# Patient Record
(demographics unavailable — no encounter records)

---

## 2024-11-04 NOTE — PHYSICAL EXAM
[No Acute Distress] : no acute distress [Well Nourished] : well nourished [Well Developed] : well developed [Well-Appearing] : well-appearing [Normal Voice/Communication] : normal voice/communication [Normal Sclera/Conjunctiva] : normal sclera/conjunctiva [PERRL] : pupils equal round and reactive to light [EOMI] : extraocular movements intact [Normal Outer Ear/Nose] : the outer ears and nose were normal in appearance [Normal Oropharynx] : the oropharynx was normal [Normal Nasal Mucosa] : the nasal mucosa was normal [No JVD] : no jugular venous distention [No Lymphadenopathy] : no lymphadenopathy [Supple] : supple [Thyroid Normal, No Nodules] : the thyroid was normal and there were no nodules present [No Respiratory Distress] : no respiratory distress  [No Accessory Muscle Use] : no accessory muscle use [Clear to Auscultation] : lungs were clear to auscultation bilaterally [Normal Rate] : normal rate  [Regular Rhythm] : with a regular rhythm [Normal S1, S2] : normal S1 and S2 [No Carotid Bruits] : no carotid bruits [No Abdominal Bruit] : a ~M bruit was not heard ~T in the abdomen [No Varicosities] : no varicosities [Pedal Pulses Present] : the pedal pulses are present [No Edema] : there was no peripheral edema [No Palpable Aorta] : no palpable aorta [No Extremity Clubbing/Cyanosis] : no extremity clubbing/cyanosis [Normal Appearance] : normal in appearance [Soft] : abdomen soft [Non Tender] : non-tender [Non-distended] : non-distended [No Masses] : no abdominal mass palpated [No HSM] : no HSM [Normal Bowel Sounds] : normal bowel sounds [Declined Rectal Exam] : declined rectal exam [Normal Supraclavicular Nodes] : no supraclavicular lymphadenopathy [Normal Axillary Nodes] : no axillary lymphadenopathy [Normal Posterior Cervical Nodes] : no posterior cervical lymphadenopathy [Normal Anterior Cervical Nodes] : no anterior cervical lymphadenopathy [Normal Inguinal Nodes] : no inguinal lymphadenopathy [Normal Femoral Nodes] : no femoral lymphadenopathy [No CVA Tenderness] : no CVA  tenderness [No Spinal Tenderness] : no spinal tenderness [No Joint Swelling] : no joint swelling [Grossly Normal Strength/Tone] : grossly normal strength/tone [Coordination Grossly Intact] : coordination grossly intact [No Focal Deficits] : no focal deficits [Normal Gait] : normal gait [Deep Tendon Reflexes (DTR)] : deep tendon reflexes were 2+ and symmetric [Speech Grossly Normal] : speech grossly normal [Memory Grossly Normal] : memory grossly normal [Normal Affect] : the affect was normal [Alert and Oriented x3] : oriented to person, place, and time [Normal Mood] : the mood was normal [Normal Insight/Judgement] : insight and judgment were intact [de-identified] : Well-healed sternotomy scar, well-healed venous harvest from right lower extremity [de-identified] : Soft systolic ejection murmur

## 2024-11-04 NOTE — HEALTH RISK ASSESSMENT
[Never (0 pts)] : Never (0 points) [No] : In the past 12 months have you used drugs other than those required for medical reasons? No [No falls in past year] : Patient reported no falls in the past year [Little interest or pleasure doing things] : 1) Little interest or pleasure doing things [Feeling down, depressed, or hopeless] : 2) Feeling down, depressed, or hopeless [0] : 2) Feeling down, depressed, or hopeless: Not at all (0) [PHQ-2 Negative - No further assessment needed] : PHQ-2 Negative - No further assessment needed [Audit-CScore] : 0 [[id number]] : 0 [Never] : Never

## 2024-11-04 NOTE — ASSESSMENT
[FreeTextEntry1] : Assessment and plan:  1.  Status post colonoscopy which was done earlier today multiple polyps were resected pathology reports are still pending patient will have follow-up colonoscopy in 3 years that is also depending upon pathology report.  2.  BPH patient states that his urination is doing extremely well with tamsulosin 0.4 mg 1 capsule daily  3.  Coronary artery disease status post CABG x 2, hyperlipidemia continue atorvastatin 40 mg p.o. daily and aspirin 81 mg p.o. daily.  4.  Hypertension patient's blood pressure under excellent control continue metoprolol tartrate 25 mg 1 tab twice a day  5.  Rosacea continue metronidazole external cream as directed  6.  Anemia comprehensive blood work drawn in office by examiner patient is taking B12 supplementation anemia workup that is CBC with differential, B12 and folate, iron studies.  7.  I reviewed previous blood work with patient and his daughter they had multiple questions regarding the blood work and also regarding today's procedure colonoscopy I answered all questions to their satisfaction.  And to the best of my capabilities.  8.  Total time spent face-to-face and non-face-to-face time 40 minutes the majority of which was spent on counseling and coordination of care.

## 2024-11-04 NOTE — HISTORY OF PRESENT ILLNESS
[Family Member] : family member [FreeTextEntry1] : Follow-up and disease management [de-identified] : Patient is an 84-year-old gentleman accompanied by his daughter who presents today for follow-up and disease management.  Patient states that has been in his usual state of health in fact earlier today patient had a complete colonoscopy and according to what was told to me 10 polyps were removed.  Medical history significant for BPH, coronary artery disease of multivessel, hyperlipidemia, hypertension and chronic lower back pain secondary to lumbar disc disease with radiculopathy and lumbar stenosis.  Presently the patient is awake alert and oriented x 3 in no acute distress calm and cooperative.

## 2024-11-04 NOTE — REVIEW OF SYSTEMS
[Earache] : no earache [Hearing Loss] : no hearing loss [Nosebleeds] : no nosebleeds [Postnasal Drip] : no postnasal drip [Nasal Discharge] : no nasal discharge [Sore Throat] : no sore throat [Hoarseness] : no hoarseness [Joint Pain] : joint pain [Joint Stiffness] : joint stiffness [Muscle Pain] : muscle pain [Muscle Weakness] : no muscle weakness [Back Pain] : no back pain [Joint Swelling] : no joint swelling [Negative] : Heme/Lymph [FreeTextEntry9] : right knee

## 2024-11-05 NOTE — PHYSICAL EXAM
[5th Left ICS - MCL] : palpated at the 5th LICS in the midclavicular line [Normal] : normal [Normal Rate] : normal [Premature Beats] : regular with premature beats [II] : a grade 2

## 2024-11-05 NOTE — CARDIOLOGY SUMMARY
[de-identified] : 11/5 yeah coming I will you, 2024 Sinus bradycardia right bundle branch block pattern noted unchanged from prior

## 2024-11-05 NOTE — REASON FOR VISIT
[Coronary Artery Disease] : coronary artery disease [FreeTextEntry1] : Patient returns for follow-up.  In general he is doing quite well.  He has no complaints of chest discomfort shortness of breath palpitations or syncope.  He admits he was having at his last visit is no longer present although his daughter is somewhat concerned over the fact that his heart rate has dipped into the 40s.

## 2024-11-05 NOTE — ASSESSMENT
[FreeTextEntry1] : impression: 1. Coronary artery disease status post bypass doing well 2 . Hypertension well controlled. 3.Sinus bradycardia  Plan 1.  Given progressive decrease of heart rate on surface EKG in the last several visits we will decrease metoprolol to 12.5 mg twice per day 2.  Decrease aspirin to 81 mg/day

## 2025-05-06 NOTE — ASSESSMENT
[FreeTextEntry1] : impression: 1. Coronary artery disease status post bypass doing well 2 . Hypertension well controlled. 3.Sinus bradycardia 4.  Hyperlipidemia at goal at last check  Plan 1. For now, continue current medical regimen

## 2025-05-06 NOTE — REASON FOR VISIT
[Hyperlipidemia] : hyperlipidemia [Hypertension] : hypertension [Coronary Artery Disease] : coronary artery disease [FreeTextEntry1] : Patient returns for followup. Feeling well. Offers no complaints of chest discomfort shortness of breath palpitations dizziness or syncope.  He is tolerating the lower dose of metoprolol

## 2025-07-01 NOTE — REVIEW OF SYSTEMS
[see HPI] : see HPI [Wake up at night to urinate  How many times?  ___] : wakes up to urinate [unfilled] times during the night [Strong urge to urinate] : strong urge to urinate [Joint Pain] : joint pain [Negative] : Heme/Lymph

## 2025-07-01 NOTE — ASSESSMENT
[FreeTextEntry1] : 85-year-old with BPH and LUTS, symptoms stable.  Discussed continuation of tamsulosin, patient amenable.  Follow-up 6 months for uroflow PVR IPSS.    We discussed the treatment options for BPH. We generally start with medical management and proceed to surgery if necessary. Alpha blockers act to relax the prostate while 5-alpha reductase inhibitors (5-Yaritza) shrink the prostate. 5-JOSEPH's are most effective in patients who have prostates that are >40gm. The former are fairly fast acting (in a matter of days-weeks) while the latter can take up to 3-6 months to take effect.

## 2025-07-01 NOTE — HISTORY OF PRESENT ILLNESS
[FreeTextEntry1] : Mr. BARTLETT is an 85-year-old male Comes for follow-up. BPH/LUTS on tamsulosin, good urine flow according to patient.   PVR 47cc. Denies fevers, chills, hematuria, flank pain.

## 2025-07-01 NOTE — PHYSICAL EXAM
[General Appearance - Well Developed] : well developed [General Appearance - Well Nourished] : well nourished [Normal Appearance] : normal appearance [Well Groomed] : well groomed [General Appearance - In No Acute Distress] : no acute distress [Abdomen Soft] : soft [Abdomen Tenderness] : non-tender [Skin Color & Pigmentation] : normal skin color and pigmentation [] : no respiratory distress [Respiration, Rhythm And Depth] : normal respiratory rhythm and effort [Exaggerated Use Of Accessory Muscles For Inspiration] : no accessory muscle use [Oriented To Time, Place, And Person] : oriented to person, place, and time [Affect] : the affect was normal [Mood] : the mood was normal [Not Anxious] : not anxious [Normal Station and Gait] : the gait and station were normal for the patient's age [No Focal Deficits] : no focal deficits